# Patient Record
Sex: FEMALE | Race: WHITE
[De-identification: names, ages, dates, MRNs, and addresses within clinical notes are randomized per-mention and may not be internally consistent; named-entity substitution may affect disease eponyms.]

---

## 2019-04-14 ENCOUNTER — HOSPITAL ENCOUNTER (EMERGENCY)
Dept: HOSPITAL 11 - JP.ED | Age: 34
Discharge: HOME | End: 2019-04-14
Payer: COMMERCIAL

## 2019-04-14 VITALS — SYSTOLIC BLOOD PRESSURE: 116 MMHG | DIASTOLIC BLOOD PRESSURE: 72 MMHG

## 2019-04-14 DIAGNOSIS — N83.202: Primary | ICD-10-CM

## 2019-04-14 DIAGNOSIS — Z79.899: ICD-10-CM

## 2019-04-14 DIAGNOSIS — F17.210: ICD-10-CM

## 2019-04-14 NOTE — EDM.PDOC
<Lul Rojas - Last Filed: 19 14:04>





ED HPI GENERAL MEDICAL PROBLEM





- General


Chief Complaint: Abdominal Pain


Stated Complaint: ABD PAIN, LEFT SIDE


Time Seen by Provider: 19 12:55





- Related Data


 Allergies











Allergy/AdvReac Type Severity Reaction Status Date / Time


 


No Known Allergies Allergy   Verified 19 12:16











Home Meds: 


 Home Meds





Levothyroxine Sodium 1 tab PO DAILY 08/22/15 [History]


Ondansetron HCl [Ondansetron] 1 tab PO TID PRN 19 [History]


RX: Ketorolac [Toradol] 1 tab PO Q4HR PRN 19 [History]











ED EXAM, GI/ABD





- Physical Exam


Text/Narrative:: 





Agree with above exam





Course





- Vital Signs


Last Recorded V/S: 


 Last Vital Signs











Temp  36.5 C   19 12:24


 


Pulse  75   19 12:24


 


Resp  14   19 12:24


 


BP  116/72   19 12:24


 


Pulse Ox  96   19 12:24














Departure





- Departure


Time of Disposition: 14:05


Disposition: Home, Self-Care 01


Condition: Fair


Clinical Impression: 


Ovarian cyst


Qualifiers:


 Laterality: left Qualified Code(s): N83.202 - Unspecified ovarian cyst, left 

side








- Discharge Information


Referrals: 


PCP,None [Primary Care Provider] - 


Forms:  ED Department Discharge


Additional Instructions: 


Continued use Toradol as needed for baseline pain control use hydrocodone for 

breakthrough pain   Please followup with your primary care provider in 3-5 days 

if not better, please call return to the emergency department with worsening of 

symptoms.,





- Assessment/Plan


Plan: 





Assessment





Acuity = acute





Site and laterality = left ovarian cyst  





Etiology  = unclear etiology  





Manifestations = abdominal pain  





Location of injury =  Home





Lab values = none  





Plan


Elected to treat empirically the Toradol does help but now she's on her 

menstrual cycle additional pain control as needed therefore prescription for 

hydrocodone 5/325 one tab by mouth every 4-6 hours when necessary total #6 have 

her follow-up with primary care in 3-5 days if no improvement

















 This note was dictated using dragon voice recognition software please call 

with any questions on syntax or grammar. 





<Xiao Johnson - Last Filed: 19 14:08>





ED HPI GENERAL MEDICAL PROBLEM





- General


Source of Information: Reports: Patient


History Limitations: Reports: No Limitations





- History of Present Illness


INITIAL COMMENTS - FREE TEXT/NARRATIVE: 





Patient seen on Tuesday for pain in lower left abdominal region. US showed 

cystic structure with the left ovary but no signs of torsion. Presents today 

with an increase in pain in the same region. She states it increases with 

eating and becomes less when stops. Patient had gall bladder removed and has 

had 3 previous c-sections. 


Onset: Gradual (over past two weeks)





Past Medical History





- Past Surgical History


GI Surgical History: Reports: Cholecystectomy


Female  Surgical History: Reports:  Section





Social & Family History





- Tobacco Use


Smoking Status *Q: Current Every Day Smoker


Years of Tobacco use: 15


Packs/Tins Daily: 0.2





ED ROS GENERAL





- Review of Systems


Review Of Systems: See Below


Constitutional: Reports: No Symptoms


HEENT: Reports: No Symptoms


Respiratory: Reports: No Symptoms


Cardiovascular: Reports: No Symptoms


Endocrine: Reports: No Symptoms


GI/Abdominal: Reports: Abdominal Pain (left sided lower quadrant, increases 

after eating, nausea increases so she stops eating then pain decreases in 

intensity again. ), Constipation (for past two day, but states hasn't eaten much

)


Musculoskeletal: Reports: No Symptoms


Skin: Reports: No Symptoms


Neurological: Reports: No Symptoms


Psychiatric: Reports: No Symptoms


Immunologic: Reports: No Symptoms





ED EXAM, GI/ABD





- Physical Exam


Exam: See Below


Text/Narrative:: 





No CVA tenderness/flank pain, denies fever/chills. Discussed with preceptor no 

acute findings and recommendation to follow up with PCP to manage cyst changes. 

Patient agress with plan. 


Exam Limited By: No Limitations


General Appearance: Alert, WD/WN, No Apparent Distress


Ears: Hearing Grossly Normal


Head: Atraumatic, Normocephalic


Neck: Full Range of Motion


Respiratory/Chest: No Respiratory Distress, Lungs Clear, Normal Breath Sounds, 

No Accessory Muscle Use, Chest Non-Tender


Cardiovascular: Regular Rate, Rhythm, No Edema, No JVD, No Murmur, No Rub


GI/Abdominal Exam: Normal Bowel Sounds, Soft, Other (tenderness to deep 

palpation left lower quadrant. )


Extremities: Normal Inspection, Normal Range of Motion, Non-Tender, No Pedal 

Edema


Neurological: Alert, Oriented, CN II-XII Intact, Normal Cognition, No Motor/

Sensory Deficits


Psychiatric: Normal Affect, Normal Mood


Skin Exam: Warm, Dry, Intact, Normal Color, No Rash


Lymphatic: No Adenopathy





Course





- Vital Signs


Text/Narrative:: 





Patient states thay while in the ED her symptoms have resolved. Discussed with 

patient that her pain is likely related to her ovarian cyst. Suggested smaller 

more frequent meals and to add Tylenol to her prescription medications of 

Toralol and Zofran. Will follow up with provider tomorrow or Tuesday. Also 

suggested she take OTC miralax to aid in bowel evacuation.

## 2021-08-16 NOTE — CRLUS
For Patients:  As a result of the 21st Century Cures Act, medical imaging 

exams and procedure reports are released immediately into your electronic 

medical record.  You may view this report before your referring provider.  

If you have questions, please contact your health care provider.



INDICATION: Right pelvic pain



TECHNIQUE: Ultrasound pelvis transabdominal and transvaginal. Endovaginal 

imaging was performed to better visualize the endometrium and ovaries. 

Real-time gray scale sonographic images with spectral and color Doppler 

imaging of the ovaries were obtained.



COMPARISON: CT 08/16/2021



FINDINGS: 



Uterus: The patient is status post hysterectomy. 



Right ovary: 3.7 x 3.3 x 4 cm with estimated volume of 25 mL. The right 

ovary has multiple small peripheral follicles noted. Normal arterial and 

venous blood flow seen in the right ovary. 



Left ovary: 3.5 x 2.7 x 2.4 cm with estimated volume of 12 mL. The left 

ovary is normal in appearance and echotexture. Arterial blood flow seen 

within the left ovary. 



Cul-de-sac: Trace ascites is seen within the right adnexa. 



IMPRESSION: 



1. Asymmetric enlargement of the right ovary is present with small 

peripheral follicles seen. Clinical correlation and follow-up is 

recommended to exclude ovarian torsion. The presence of arterial and venous 

blood flow within the right ovary is consistent with ovarian viability.



Dictated by Bandar Whitney MD @ 8/16/2021 11:25:57 PM



Dictated by: Bandar Whitney MD @ 08/16/2021 23:26:02



(Electronically Signed)

## 2021-08-16 NOTE — CRLCT
For Patients:  As a result of the 21st Century Cures Act, medical imaging 

exams and procedure reports are released immediately into your electronic 

medical record.  You may view this report before your referring provider.  

If you have questions, please contact your health care provider.



INDICATION: Right lower quadrant abdominal pain. 



CT ABDOMEN AND PELVIS WITH CONTRAST



TECHNIQUE:  Multidetector CT imaging was performed through the abdomen and 

pelvis following intravenous contrast administration using 100 mL 

Isovue-300. Coronal and sagittal reconstructions were generated.



COMPARISON:  None.



FINDINGS:



Lower chest:  Very mild bibasilar lung atelectasis, greatest on the right.



Liver:  Within normal limits. 



Gallbladder and bile ducts:  Status post cholecystectomy. No biliary 

dilation identified.



Pancreas:  Unremarkable.



Spleen:  Borderline splenic enlargement.



Adrenals:  No nodules or masses.



Kidneys, ureters, and urinary bladder:  No renal masses or hydronephrosis.  

No bladder mass or definite wall thickening.



Gastrointestinal tract:  Normal caliber bowel without wall thickening.  The 

appendix is normal.



Vascular structures:  Normal for age.



Peritoneum:  Trace free fluid in the right pericolic gutter and in the 

cul-de-sac region. No free air identified in the abdomen or pelvis.



Lymph nodes:  No pathologically enlarged nodes identified.



Reproductive organs:  Status post hysterectomy. Nonspecific mild 

enlargement of the right ovary. A 2 centimeter peripherally enhancing focus 

within the medial right ovary may represent a corpus luteum. Unremarkable 

left ovary.



Bones:  Normal for age.



IMPRESSION:



1. Nonspecific mild enlargement of the right ovary, and minimal free fluid 

in the right pericolic gutter and cul-de-sac. Pelvic ultrasound is 

recommended to exclude right ovarian torsion.



2. Normal appendix. 



3. Status post cholecystectomy and hysterectomy. 



NEMESIO MAYER MD



[facility]



Dictated by Hollis Mayer MD @ 8/16/2021 9:49:42 PM



Please note that all CT scans at this facility use dose modulation, 

iterative reconstruction, and/or weight-based dosing when appropriate to 

reduce radiation dose to as low as reasonably achievable.



Dictated by: Hollis Mayer MD @ 08/16/2021 21:50:08



(Electronically Signed)

## 2021-08-16 NOTE — EDM.PDOC
ED HPI GENERAL MEDICAL PROBLEM





- General


Chief Complaint: Abdominal Pain


Stated Complaint: ABDOMINAL PAIN LOWER R


Time Seen by Provider: 21 19:28


Source of Information: Reports: Patient


History Limitations: Reports: No Limitations





- History of Present Illness


INITIAL COMMENTS - FREE TEXT/NARRATIVE: 





35 yo female presents to the ER with 12 hour hx of right ABD pain.  She was 

initially seen at the clinic where UA and labs were drawn. Pain started this 

morning.  She had a normal day yesterday and slept through the night.  She 

denies general illness.  She is afebrile.  ABD pain progressed throughout the 

day.  pain is present in the RLQ worse with each step.  she is status post 

hysterectomy with ovaries remaining.  She has had 4 c-sections and 

cholecystectomy.   


  ** Right Lower Abdomen


Pain Score (Numeric/FACES): 8





- Related Data


                                    Allergies











Allergy/AdvReac Type Severity Reaction Status Date / Time


 


No Known Allergies Allergy   Verified 19 12:16











Home Meds: 


                                    Home Meds





Levothyroxine Sodium 1 tab PO DAILY 08/22/15 [History]











Past Medical History


OB/GYN History: Reports: Pregnancy


Endocrine/Metabolic History: Reports: Hyperthyroidism





- Infectious Disease History


Infectious Disease History: Reports: Chicken Pox





- Past Surgical History


GI Surgical History: Reports: Cholecystectomy


Female  Surgical History: Reports:  Section, Hysterectomy





Social & Family History





- Tobacco Use


Tobacco Use Status *Q: Current Every Day Tobacco User


Years of Tobacco use: 15


Packs/Tins Daily: 0.5





- Caffeine Use


Caffeine Use: Reports: Soda





- Recreational Drug Use


Recreational Drug Use: No





ED ROS GENERAL





- Review of Systems


Review Of Systems: See Below


Constitutional: Denies: Fever, Chills, Fatigue


Respiratory: Denies: Shortness of Breath, Wheezing


Cardiovascular: Denies: Chest Pain


GI/Abdominal: Reports: Abdominal Pain, Nausea.  Denies: Constipation, Diarrhea, 

Vomiting


Neurological: Denies: Headache





ED EXAM, GI/ABD





- Physical Exam


Exam: See Below


Exam Limited By: No Limitations


General Appearance: Alert, WD/WN, No Apparent Distress


Head: Atraumatic, Normocephalic


Respiratory/Chest: No Respiratory Distress, Lungs Clear, Normal Breath Sounds.  

No: Crackles, Rhonchi, Wheezing


Cardiovascular: Regular Rate, Rhythm, No Murmur


GI/Abdominal Exam: Soft, No Organomegaly, No Distention, Tender (RLQ)


Neurological: Alert, Oriented


Psychiatric: Normal Affect, Normal Mood


Skin Exam: Warm, Dry, Intact





Course





- Vital Signs


Last Recorded V/S: 


                                Last Vital Signs











Temp  37.5 C   21 19:23


 


Pulse  79   21 19:23


 


Resp  16   21 19:23


 


BP  131/82   21 19:23


 


Pulse Ox  100   21 19:23














- Orders/Labs/Meds


Orders: 


                               Active Orders 24 hr











 Category Date Time Status


 


 Pelvis Non OB Ltd [US] Stat Exams  21 21:58 Ordered


 


 Iopamidol [Isovue-300 (61%)] Med  21 20:15 Active





 100 ml IV .AS DIRECTED   


 


 Sodium Chloride 0.9% [Normal Saline] 1,000 ml Med  21 20:00 Active





 IV ASDIRECTED   


 


 Sodium Chloride 0.9% [Normal Saline] 80 ml Med  21 20:15 Active





 IV ASDIRECTED   


 


 Sodium Chloride 0.9% [Saline Flush] Med  21 20:04 Active





 10 ml FLUSH ASDIRECTED PRN   








                                Medication Orders





Sodium Chloride (Normal Saline)  1,000 mls @ 500 mls/hr IV ASDIRECTED JOANNE


   Last Admin: 21 21:00  Dose: 500 mls/hr


   Documented by: LATOYA


Sodium Chloride (Normal Saline)  80 mls @ 3 mls/sec IV ASDIRECTED JOANNE


   Last Admin: 21 20:18  Dose: 3 mls/sec


   Documented by: WES


Iopamidol (Iopamidol 612 Mg/Ml 100 Ml Bottle)  100 ml IV .AS DIRECTED JOANNE


   Last Admin: 21 20:18  Dose: 100 ml


   Documented by: WES


Sodium Chloride (Sodium Chloride 0.9% 10 Ml Syringe)  10 ml FLUSH ASDIRECTED PRN


   PRN Reason: Keep Vein Open


   Last Admin: 21 20:17  Dose: 10 ml


   Documented by: WES


   Admin: 21 20:14  Dose: 10 ml


   Documented by: LATOYA








Meds: 


Medications











Generic Name Dose Route Start Last Admin





  Trade Name Freq  PRN Reason Stop Dose Admin


 


Sodium Chloride  1,000 mls @ 500 mls/hr  21 20:00  21 21:00





  Normal Saline  IV   500 mls/hr





  ASDIRECTED JOANNE   Administration


 


Sodium Chloride  80 mls @ 3 mls/sec  21 20:15  21 20:18





  Normal Saline  IV   3 mls/sec





  ASDIRECTED JOANNE   Administration


 


Iopamidol  100 ml  21 20:15  21 20:18





  Iopamidol 612 Mg/Ml 100 Ml Bottle  IV   100 ml





  .AS DIRECTED JOANNE   Administration


 


Sodium Chloride  10 ml  21 20:04  21 20:17





  Sodium Chloride 0.9% 10 Ml Syringe  FLUSH   10 ml





  ASDIRECTED PRN   Administration





  Keep Vein Open  














Discontinued Medications














Generic Name Dose Route Start Last Admin





  Trade Name Freq  PRN Reason Stop Dose Admin


 


Ketorolac Tromethamine  30 mg  21 22:00  21 22:06





  Ketorolac 30 Mg/Ml Sdv  IVPUSH  21 22:01  30 mg





  ONETIME ONE   Administration


 


Morphine Sulfate  2 mg  21 19:53  21 20:10





  Morphine 2 Mg/Ml Syringe  IVPUSH  21 19:54  2 mg





  ONETIME ONE   Administration


 


Ondansetron HCl  4 mg  21 19:52  21 20:08





  Ondansetron 4 Mg/2 Ml Sdv  IVPUSH  21 19:53  4 mg





  ONETIME ONE   Administration














- Re-Assessments/Exams


Free Text/Narrative Re-Assessment/Exam: 





21 20:02


clinic lab: electrolytes are balanced, creatine 0.96, BUN 15, liver functioning 

normally.  WBC 10, Hgb 14.2 with normal RBC volume and size, normal 

differential.  urine analysis non-indicatory or infection and negative blood. 


21 22:34


ct revealed right ovary enlargement minimal free fluid, US for evaluation of 

ovarian torsion.  





Departure





- Departure


Time of Disposition: 22:36


Disposition: Home, Self-Care 01


Condition: Good


Clinical Impression: 


Ovarian cyst


Qualifiers:


 Laterality: left Qualified Code(s): N83.202 - Unspecified ovarian cyst, left 

side








- Discharge Information


*PRESCRIPTION DRUG MONITORING PROGRAM REVIEWED*: Not Applicable


*COPY OF PRESCRIPTION DRUG MONITORING REPORT IN PATIENT NEYMAR: Not Applicable


Instructions:  Ovarian Cyst, Easy-to-Read


Referrals: 


Key Montes [id number] [Primary Care Provider] - 


Forms:  ED Department Discharge


Additional Instructions: 


pain control ibuprofen 400-600 mg every 6 hours for pain be sure that you are 

eating food with this medication to protect your stomach








Sepsis Event Note (ED)





- Focused Exam


Vital Signs: 


                                   Vital Signs











  Temp Pulse Resp BP Pulse Ox


 


 21 19:23  37.5 C  79  16  131/82  100














- My Orders


Last 24 Hours: 


My Active Orders





21 20:00


Sodium Chloride 0.9% [Normal Saline] 1,000 ml IV ASDIRECTED 





21 20:04


Sodium Chloride 0.9% [Saline Flush]   10 ml FLUSH ASDIRECTED PRN 





21 20:15


Iopamidol [Isovue-300 (61%)]   100 ml IV .AS DIRECTED 


Sodium Chloride 0.9% [Normal Saline] 80 ml IV ASDIRECTED 





21 21:58


Pelvis Non OB Ltd [US] Stat 














- Assessment/Plan


Last 24 Hours: 


My Active Orders





21 20:00


Sodium Chloride 0.9% [Normal Saline] 1,000 ml IV ASDIRECTED 





21 20:04


Sodium Chloride 0.9% [Saline Flush]   10 ml FLUSH ASDIRECTED PRN 





21 20:15


Iopamidol [Isovue-300 (61%)]   100 ml IV .AS DIRECTED 


Sodium Chloride 0.9% [Normal Saline] 80 ml IV ASDIRECTED 





21 21:58


Pelvis Non OB Ltd [US] Stat

## 2021-08-17 NOTE — CRLUS
Final Report: 

INDICATION: Right pelvic pain



TECHNIQUE: Ultrasound pelvis transabdominal and transvaginal. Endovaginal 
imaging was performed to better visualize the endometrium and ovaries. Real-time
gray scale sonographic images with spectral and color Doppler imaging of the 
ovaries were obtained.



COMPARISON: CT 08/16/2021



FINDINGS: 



Uterus: The patient is status post hysterectomy. 



Right ovary: 3.7 x 3.3 x 4 cm with estimated volume of 25 mL. The right ovary 
has multiple small peripheral follicles noted. Normal arterial and venous blood 
flow seen in the right ovary. 



Left ovary: 3.5 x 2.7 x 2.4 cm with estimated volume of 12 mL. The left ovary is
normal in appearance and echotexture. Arterial blood flow seen within the left 
ovary. 



Cul-de-sac: Trace ascites is seen within the right adnexa. 



IMPRESSION: 

1. Asymmetric enlargement of the right ovary is present with small peripheral 
follicles seen. Clinical correlation and follow-up is recommended to exclude 
ovarian torsion. The presence of arterial and venous blood flow within the right
ovary is consistent with ovarian viability.



Dictated by Bandar Whitney MD @ 8/16/2021 11:25:57 PM





Dictated by: Bandar Whitney MD @ 08/16/2021 23:26:02

Signed by: Bandar Whitney MD @8/16/2021 11:26:02 PM

(Electronic Signature)



[email or web address]